# Patient Record
Sex: FEMALE | Race: BLACK OR AFRICAN AMERICAN | Employment: UNEMPLOYED | URBAN - METROPOLITAN AREA
[De-identification: names, ages, dates, MRNs, and addresses within clinical notes are randomized per-mention and may not be internally consistent; named-entity substitution may affect disease eponyms.]

---
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
## 2021-02-15 LAB — VARICELLA-ZOSTER VIRUS AB, IGG: NORMAL

## 2021-03-22 ENCOUNTER — APPOINTMENT (OUTPATIENT)
Dept: GENERAL RADIOLOGY | Age: 21
End: 2021-03-22
Payer: COMMERCIAL

## 2021-03-22 ENCOUNTER — HOSPITAL ENCOUNTER (EMERGENCY)
Age: 21
Discharge: HOME | End: 2021-03-22
Payer: COMMERCIAL

## 2021-03-22 VITALS
RESPIRATION RATE: 18 BRPM | WEIGHT: 130 LBS | OXYGEN SATURATION: 99 % | DIASTOLIC BLOOD PRESSURE: 78 MMHG | BODY MASS INDEX: 20.89 KG/M2 | HEIGHT: 66 IN | TEMPERATURE: 98.9 F | SYSTOLIC BLOOD PRESSURE: 123 MMHG | HEART RATE: 82 BPM

## 2021-03-22 DIAGNOSIS — S63.91XA SPRAIN OF RIGHT HAND, INITIAL ENCOUNTER: ICD-10-CM

## 2021-03-22 DIAGNOSIS — S63.501A SPRAIN OF RIGHT WRIST, INITIAL ENCOUNTER: Primary | ICD-10-CM

## 2021-03-22 PROCEDURE — 99211 OFF/OP EST MAY X REQ PHY/QHP: CPT

## 2021-03-22 PROCEDURE — L3809 WHFO W/O JOINTS PRE OTS: HCPCS

## 2021-03-22 PROCEDURE — 73110 X-RAY EXAM OF WRIST: CPT

## 2021-03-22 PROCEDURE — 73130 X-RAY EXAM OF HAND: CPT

## 2021-03-22 RX ORDER — IBUPROFEN 600 MG/1
600 TABLET ORAL EVERY 6 HOURS PRN
Qty: 20 TABLET | Refills: 0 | Status: SHIPPED | OUTPATIENT
Start: 2021-03-22 | End: 2021-11-12 | Stop reason: ALTCHOICE

## 2021-03-22 RX ORDER — IBUPROFEN 200 MG
400 TABLET ORAL EVERY 6 HOURS PRN
COMMUNITY
End: 2021-03-22 | Stop reason: ALTCHOICE

## 2021-03-22 ASSESSMENT — PAIN DESCRIPTION - PROGRESSION: CLINICAL_PROGRESSION: GRADUALLY IMPROVING

## 2021-03-22 ASSESSMENT — PAIN DESCRIPTION - LOCATION: LOCATION: WRIST

## 2021-03-22 ASSESSMENT — PAIN DESCRIPTION - FREQUENCY: FREQUENCY: CONTINUOUS

## 2021-03-22 ASSESSMENT — PAIN DESCRIPTION - PAIN TYPE: TYPE: ACUTE PAIN

## 2021-03-22 ASSESSMENT — PAIN DESCRIPTION - ORIENTATION: ORIENTATION: RIGHT

## 2021-03-22 ASSESSMENT — PAIN DESCRIPTION - DESCRIPTORS: DESCRIPTORS: SHOOTING

## 2021-03-22 NOTE — ED PROVIDER NOTES
respiratory distress  · CV:  Regular rate. Regular rhythm. No murmurs, gallops, or rubs. 2+ distal pulses  · GI:  Abdomen Soft,   · Musculoskeletal: Moves all extremities x 4. Warm and well perfused, no clubbing, cyanosis, or edema. Capillary refill <3 seconds/ She has f ull ROM Of the left shoulder, She has full ROM of the left knee and ankle. diminished rom of the right wrist with pain, no edema, normal radial pulse and cap refill. · Integument: skin warm and dry. No rashes. · Lymphatic: no lymphadenopathy noted  · Neurologic: GCS 15, no focal deficits, symmetric strength 5/5 in the upper and lower extremities bilaterally  · Psychiatric: Normal Affect    Lab / Imaging Results   (All laboratory and radiology results have been personally reviewed by myself)  Labs:  No results found for this visit on 03/22/21. Imaging: All Radiology results interpreted by Radiologist unless otherwise noted. XR HAND RIGHT (MIN 3 VIEWS)   Final Result   No fracture or dislocation of right wrist or hand. XR WRIST RIGHT (MIN 3 VIEWS)   Final Result   No fracture or dislocation of right wrist or hand. ED Course / Medical Decision Making   Medications - No data to display     MDM:   Obtain an x-ray of the right hand and wrist and it was negative. She was placed in a Velcro wrist splint I advised her to apply an ice pack 10 minutes at a time every 2-3 hours I ordered ibuprofen as needed for pain if no improvement she should see her doctor for reevaluation. Assessment      1. Sprain of right wrist, initial encounter    2.  Sprain of right hand, initial encounter      Plan   Discharge to home and advised to contact Elsie Quintero MD  [address]       As needed   Patient condition is good    New Medications     New Prescriptions    IBUPROFEN (ADVIL;MOTRIN) 600 MG TABLET    Take 1 tablet by mouth every 6 hours as needed for Pain     Electronically signed by Román Mock

## 2021-03-22 NOTE — LETTER
[facility]  [address] [facility] [address]  Phone: [phone]               March 22, 2021    Patient: Maday Mayes   YOB: 2000   Date of Visit: 3/22/2021       To Whom It May Concern:    Maday Mayes was seen and treated in our emergency department on 3/22/2021. She will be absent from work on 3/23 due to medical reasons.       Sincerely,       RUBEN Doran CNP         Signature:__________________________________

## 2021-05-11 ENCOUNTER — HOSPITAL ENCOUNTER (EMERGENCY)
Age: 21
Discharge: HOME | End: 2021-05-11
Payer: COMMERCIAL

## 2021-05-11 VITALS
DIASTOLIC BLOOD PRESSURE: 79 MMHG | HEIGHT: 65 IN | SYSTOLIC BLOOD PRESSURE: 121 MMHG | BODY MASS INDEX: 21.16 KG/M2 | TEMPERATURE: 97.7 F | RESPIRATION RATE: 16 BRPM | WEIGHT: 127 LBS | OXYGEN SATURATION: 99 % | HEART RATE: 79 BPM

## 2021-05-11 DIAGNOSIS — J06.9 UPPER RESPIRATORY TRACT INFECTION, UNSPECIFIED TYPE: Primary | ICD-10-CM

## 2021-05-11 LAB — SARS-COV-2, NAAT: NOT DETECTED

## 2021-05-11 PROCEDURE — 99212 OFFICE O/P EST SF 10 MIN: CPT

## 2021-05-11 RX ORDER — DOXYCYCLINE HYCLATE 100 MG
100 TABLET ORAL 2 TIMES DAILY
Qty: 20 TABLET | Refills: 0 | Status: SHIPPED | OUTPATIENT
Start: 2021-05-11 | End: 2021-05-21

## 2021-05-11 NOTE — ED PROVIDER NOTES
Department of Emergency [address] [facility]  Provider Note  Admit Date/RoomTime: 5/11/2021  8:13 AM  Room: 02/02  Chief Complaint   Otalgia (Pt c/o R sided sore throat, ear ache, chest congestion, and diarrhea for 4 days), Pharyngitis, and Diarrhea    History of Present Illness   Source of history provided by:  Patient. History/Exam Limitations: None. Evelina Real is a 24 y.o. female with no significant medical history. Patient presents with a 5-day history of sinus pressure, bilateral ear pain, nasal congestion, sore throat greatest on the right, chills, chest congestion, and diarrhea. No nausea or hypogeusia. No chest pain or shortness of breath. She does note some abdominal cramping primarily in the upper abdomen prior to the diarrhea. No melena or hematochezia. No risk factors for infectious diarrhea. No history of underlying cardiopulmonary disease. No exposures to any individuals diagnosed with COVID-19 or PUIs. No high risk domestic or international travel. 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 Did have Covid approximately 5 months ago and her second dose of the during the vaccine over 30 days ago. ROS    Pertinent positives and negatives are stated within HPI, all other systems reviewed and are negative. History reviewed. No pertinent surgical history. Social History:  reports that she has never smoked. She has never used smokeless tobacco. She reports that she does not drink alcohol or use drugs. Family History: family history is not on file. Allergies: Patient has no known allergies. Physical Exam            ED Triage Vitals [05/11/21 0813]   BP Temp Temp Source Heart Rate Resp SpO2 Height Weight - Scale   121/79 97.7 °F (36.5 °C) Infrared 79 16 99 % 5' 5\" (1.651 m) 127 lb (57.6 kg)      Oxygen Saturation Interpretation: Normal.    Gen.: Vitals noted no distress. Afebrile. Normal phonation, no stridor, no trismus.   Not hypoxemic. Clinically well-appearing. ENT: Appears well-hydrated. TMs bilaterally are mildly erythematous. Posterior oropharynx is erythematous but without exudate or asymmetry. Neck: Supple. No meningismus through full range of motion. No anterior cervical or submandibular lymphadenopathy. No posterior lymphadenopathy. Cardiac: Regular rate rhythm no murmur. Lungs: Good aeration throughout. No adventitious breath sounds. Abdomen: Soft, nontender, nonsurgical throughout. Normoactive bowel sounds. Extremities: No peripheral edema, negative Homans bilaterally no cords. Skin: No rash. Neuro: No gross neurologic deficits. Lab / Imaging Results   (All laboratory and radiology results have been personally reviewed by myself)  Labs:  No results found for this visit on 05/11/21. Imaging: All Radiology results interpreted by Radiologist unless otherwise noted. No orders to display     ED Course / Medical Decision Making   Medications - No data to display       Differential Diagnosis: Is extensive but includes viral URI, COVID-19, community-acquired pneumonia, etc.    MDM:     This is a 24 y.o. female who presents with the above history. The patient has an unremarkable physical exam.  Did obtain a rapid Covid however the  apparently is not able to perform that currently. I am told will be able to perform it within the next hour. Informed the patient that I will call her if it comes back positive. If negative, she is cleared to return to work tomorrow. Will be covered with doxycycline for the otitis media and likely sinusitis. The patient patient is clinically very well-appearing and nontoxic with no evidence of acute cardiopulmonary compromise. 09:30-rapid Covid came back negative per the tech. Plan of Care: Normal progression of disease discussed. All questions answered. Explained symptomatic treatment.   Instruction provided in the use of fluids, vaporizer, acetaminophen,

## 2021-05-29 ENCOUNTER — HOSPITAL ENCOUNTER (EMERGENCY)
Age: 21
Discharge: HOME | End: 2021-05-29
Payer: COMMERCIAL

## 2021-05-29 VITALS
HEIGHT: 65 IN | WEIGHT: 125 LBS | BODY MASS INDEX: 20.83 KG/M2 | OXYGEN SATURATION: 99 % | HEART RATE: 78 BPM | TEMPERATURE: 97.8 F | DIASTOLIC BLOOD PRESSURE: 80 MMHG | SYSTOLIC BLOOD PRESSURE: 134 MMHG | RESPIRATION RATE: 14 BRPM

## 2021-05-29 DIAGNOSIS — J06.9 VIRAL URI WITH COUGH: Primary | ICD-10-CM

## 2021-05-29 PROCEDURE — 99211 OFF/OP EST MAY X REQ PHY/QHP: CPT

## 2021-05-29 RX ORDER — GUAIFENESIN, PSEUDOEPHEDRINE HYDROCHLORIDE 600; 60 MG/1; MG/1
1 TABLET, EXTENDED RELEASE ORAL EVERY 12 HOURS
Qty: 10 TABLET | Refills: 0 | Status: SHIPPED | OUTPATIENT
Start: 2021-05-29 | End: 2021-06-03

## 2021-05-29 RX ORDER — FLUTICASONE PROPIONATE 50 MCG
1 SPRAY, SUSPENSION (ML) NASAL DAILY
Qty: 1 BOTTLE | Refills: 0 | Status: SHIPPED | OUTPATIENT
Start: 2021-05-29

## 2021-05-29 RX ORDER — METHYLPREDNISOLONE 4 MG/1
TABLET ORAL
Qty: 21 TABLET | Refills: 0 | Status: SHIPPED | OUTPATIENT
Start: 2021-05-29 | End: 2021-06-04

## 2021-05-29 ASSESSMENT — PAIN SCALES - GENERAL: PAINLEVEL_OUTOF10: 5

## 2021-05-29 ASSESSMENT — PAIN DESCRIPTION - PAIN TYPE
TYPE: ACUTE PAIN
TYPE: ACUTE PAIN

## 2021-05-29 ASSESSMENT — PAIN DESCRIPTION - FREQUENCY
FREQUENCY: CONTINUOUS
FREQUENCY: CONTINUOUS

## 2021-05-29 ASSESSMENT — PAIN DESCRIPTION - LOCATION
LOCATION: CHEST
LOCATION: CHEST

## 2021-05-29 ASSESSMENT — PAIN - FUNCTIONAL ASSESSMENT
PAIN_FUNCTIONAL_ASSESSMENT: ACTIVITIES ARE NOT PREVENTED
PAIN_FUNCTIONAL_ASSESSMENT: ACTIVITIES ARE NOT PREVENTED
PAIN_FUNCTIONAL_ASSESSMENT: 0-10

## 2021-05-29 ASSESSMENT — PAIN DESCRIPTION - PROGRESSION
CLINICAL_PROGRESSION: NOT CHANGED
CLINICAL_PROGRESSION: NOT CHANGED

## 2021-05-29 ASSESSMENT — PAIN DESCRIPTION - ONSET
ONSET: ON-GOING
ONSET: ON-GOING

## 2021-05-29 ASSESSMENT — PAIN DESCRIPTION - DESCRIPTORS
DESCRIPTORS: TIGHTNESS
DESCRIPTORS: TIGHTNESS

## 2021-05-29 NOTE — ED PROVIDER NOTES
[address] [facility]  Department of Emergency Medicine   ED  Encounter Note  Admit Date/RoomTime: 2021  6:01 PM  ED Room:     NAME: Efrem Hernández  : 2000  MRN: [id number]     Chief Complaint:  Cough (Pt states \"I had phlem & productive cough with Blood in it\" ) and URI (Pt states \"I had an URI a month ago & the symptoms seem to persist\" )    History of Present Illness       Efrem Hernández is a 24 y.o. old female who presents to the emergency department with a complaint of sinus congestion, nasal drainage, ear pressure, sore throat and cough. Patient was actually seen earlier this month with all the same complaints. At that time she was diagnosed with a URI and prescribed doxycycline, which she took. She returns stating that again she has all the symptoms. For the past week she is again congested in all the sinuses. Nasal drainage. Sometimes bloody. Ear pressure bilateral.  Throat is a little sore. She states the cough is worse this time. She is actually coughing up phlegm that is also bloody at times. Symptoms are moderate in severity. She has tried over-the-counter ZzzQuil, just to get some sleep at night. Patient has had her coronavirus vaccination. ROS   Pertinent positives and negatives are stated within HPI, all other systems reviewed and are negative. Past Medical History:  has a past medical history of Depression. Surgical History:  has no past surgical history on file. Social History:  reports that she has never smoked. She has never used smokeless tobacco. She reports that she does not drink alcohol and does not use drugs. Family History: family history is not on file. Allergies: Patient has no known allergies.     Physical Exam   Oxygen Saturation Interpretation: Normal.        ED Triage Vitals [21 1804]   BP Temp Temp Source Heart Rate Resp SpO2 Height Weight - Scale   134/80 97.8 °F (36.6 °C) Temporal 78 14 99 % 5' 5\" (1.651 m) 125 lb infection is likely to  be viral in etiology. Antibiotics are not indicated at this time based on clinical presentation and physical findings. She is not hypoxic. Patient is well appearing, non toxic and appropriate for outpatient management. Patient was already put on doxycycline just 20 days ago for a URI. I do not feel she needs another antibiotic. A diagnosis of Upper Respiratory Infection is most likely viral in nature. Antibiotics have no effect on viruses and is not prescribed for viral illnesses. It is recommended that you increase your fluid intake. Take otc Tylenol or Motrin as needed. Place a vaporizer or humidifier in your room to moisten the air. Drink warm teas with honey. If you have a sore throat then gargle with warm salt water 3 times daily. OTC decongestants may or may not benefit you. URI's can last 10-14 days. If you feel that your symptoms are worsening or you have any concerns please follow up with your family doctor or go to your closest ED/Urgent care. Plan of Care/Counseling:  I reviewed today's visit with the patient in addition to providing specific details for the plan of care and counseling regarding the diagnosis and prognosis. Questions are answered at this time and are agreeable with the plan. Assessment     1. Viral URI with cough      Plan   Discharge home. Patient condition is good    New Medications     New Prescriptions    FLUTICASONE (FLONASE) 50 MCG/ACT NASAL SPRAY    1 spray by Nasal route daily    METHYLPREDNISOLONE (MEDROL, MADIHA,) 4 MG TABLET    Take as directed on package insert days 1-6    PSEUDOEPHEDRINE-GUAIFENESIN (MUCINEX D)  MG PER EXTENDED RELEASE TABLET    Take 1 tablet by mouth every 12 hours for 5 days     Electronically signed by LEONARDO Reyna   DD: 5/29/21  **This report was transcribed using voice recognition software.  Every effort was made to ensure accuracy; however, inadvertent computerized transcription errors may be present.   END OF ED PROVIDER NOTE         Michael Wang [place]  05/29/21 5726

## 2021-07-19 ENCOUNTER — HOSPITAL ENCOUNTER (EMERGENCY)
Age: 21
Discharge: HOME | End: 2021-07-19
Payer: COMMERCIAL

## 2021-07-19 VITALS
BODY MASS INDEX: 19.99 KG/M2 | HEART RATE: 84 BPM | SYSTOLIC BLOOD PRESSURE: 113 MMHG | RESPIRATION RATE: 16 BRPM | DIASTOLIC BLOOD PRESSURE: 74 MMHG | TEMPERATURE: 98.2 F | HEIGHT: 65 IN | WEIGHT: 120 LBS | OXYGEN SATURATION: 99 %

## 2021-07-19 DIAGNOSIS — J01.10 ACUTE FRONTAL SINUSITIS, RECURRENCE NOT SPECIFIED: Primary | ICD-10-CM

## 2021-07-19 DIAGNOSIS — H69.83 DYSFUNCTION OF BOTH EUSTACHIAN TUBES: ICD-10-CM

## 2021-07-19 PROCEDURE — 99211 OFF/OP EST MAY X REQ PHY/QHP: CPT

## 2021-07-19 RX ORDER — AMOXICILLIN AND CLAVULANATE POTASSIUM 875; 125 MG/1; MG/1
1 TABLET, FILM COATED ORAL 2 TIMES DAILY
Qty: 14 TABLET | Refills: 0 | Status: SHIPPED | OUTPATIENT
Start: 2021-07-19 | End: 2021-07-26

## 2021-07-19 ASSESSMENT — PAIN DESCRIPTION - ONSET
ONSET: ON-GOING
ONSET: ON-GOING

## 2021-07-19 ASSESSMENT — PAIN DESCRIPTION - PAIN TYPE
TYPE: ACUTE PAIN
TYPE: ACUTE PAIN

## 2021-07-19 ASSESSMENT — PAIN DESCRIPTION - FREQUENCY
FREQUENCY: CONTINUOUS
FREQUENCY: CONTINUOUS

## 2021-07-19 ASSESSMENT — PAIN DESCRIPTION - DESCRIPTORS
DESCRIPTORS: SORE
DESCRIPTORS: SORE

## 2021-07-19 ASSESSMENT — PAIN DESCRIPTION - LOCATION
LOCATION: CHEST
LOCATION: CHEST

## 2021-07-19 ASSESSMENT — PAIN DESCRIPTION - PROGRESSION
CLINICAL_PROGRESSION: NOT CHANGED
CLINICAL_PROGRESSION: NOT CHANGED

## 2021-07-19 ASSESSMENT — PAIN SCALES - GENERAL: PAINLEVEL_OUTOF10: 4

## 2021-07-19 NOTE — ED PROVIDER NOTES
Department of Emergency Medicine   [address] [place] [address]  Provider Note  Admit Date/RoomTime: 2021  3:38 PM  Room:   NAME: Shamika Mcnulty  : 2000  MRN: [id number]     Chief Complaint:  Otalgia (both   /  Pt requesting WORK EXCUSE) and Chest Congestion    History of Present Illness       Shamika Mcnulty is a 24 y.o. old female who presents to the emergency department for evaluation of sinus symptoms she has pressure over her forehead and around her nose on the cheeks. She said she has been sick for 1 week. She is feels pressure over her sinuses. Postnasal drip. And she feels like she has some upper chest congestion. She denies any fever chills or body aches denies any loss of taste or smell states she also has pressure in her ears. ROS    Pertinent positives and negatives are stated within HPI, all other systems reviewed and are negative. History reviewed. No pertinent surgical history. Social History:  reports that she has never smoked. She has never used smokeless tobacco. She reports that she does not drink alcohol and does not use drugs. Family History: family history is not on file. Allergies: Patient has no known allergies. Physical Exam            ED Triage Vitals [21 1542]   BP Temp Temp Source Heart Rate Resp SpO2 Height Weight   113/74 98.2 °F (36.8 °C) Temporal 84 16 99 % 5' 5\" (1.651 m) 120 lb (54.4 kg)      Oxygen Saturation Interpretation: Normal.    Constitutional:  Alert, development consistent with age. Ears:  External Ears: Bilateral normal.               TM's & External Canals: normal appearance, normal TMs bilaterally. Nose:   There is no discharge, swelling or lesions noted. Sinuses: mild Bilateral maxillary sinus tenderness. mild Bilateral frontal sinus tenderness. Mouth:  normal tongue and buccal mucosa. Throat: mild erythema. Airway Patent. Neck/Lymphatics:  Neck Supple.    Respiratory:   Breath sounds: Nestor Yadav, RUBEN - CNP  07/19/21 1622

## 2021-07-19 NOTE — LETTER
[facility]  [address] [facility] [address]  Phone: [phone]               July 19, 2021    Patient: Naomi Grant   YOB: 2000   Date of Visit: 7/19/2021       To Whom It May Concern:    Naomi Grant was seen and treated in our emergency department on 7/19/2021. She was absent from work today due to illness.       Sincerely,       RUBEN Padilla CNP         Signature:__________________________________

## 2021-07-29 ENCOUNTER — HOSPITAL ENCOUNTER (EMERGENCY)
Age: 21
Discharge: HOME | End: 2021-07-29
Payer: COMMERCIAL

## 2021-07-29 ENCOUNTER — APPOINTMENT (OUTPATIENT)
Dept: GENERAL RADIOLOGY | Age: 21
End: 2021-07-29
Payer: COMMERCIAL

## 2021-07-29 VITALS
RESPIRATION RATE: 16 BRPM | DIASTOLIC BLOOD PRESSURE: 84 MMHG | WEIGHT: 120 LBS | OXYGEN SATURATION: 100 % | TEMPERATURE: 97.8 F | BODY MASS INDEX: 19.97 KG/M2 | SYSTOLIC BLOOD PRESSURE: 122 MMHG | HEART RATE: 77 BPM

## 2021-07-29 DIAGNOSIS — S90.32XA CONTUSION OF LEFT FOOT, INITIAL ENCOUNTER: Primary | ICD-10-CM

## 2021-07-29 PROCEDURE — 99211 OFF/OP EST MAY X REQ PHY/QHP: CPT

## 2021-07-29 PROCEDURE — 73630 X-RAY EXAM OF FOOT: CPT

## 2021-07-29 NOTE — LETTER
[facility]  [address] [facility] [address]  Phone: [phone]               July 29, 2021    Patient: Katarina Qureshi   YOB: 2000   Date of Visit: 7/29/2021       To Whom It May Concern:    Katarina Qureshi was seen and treated in our emergency department on 7/29/2021. She may return to work on 7/30/21.       Sincerely,       Rachel Olszewski, PA         Signature:__________________________________

## 2021-07-29 NOTE — ED PROVIDER NOTES
Department of Emergency Medicine   [address] [place] [address]  Provider Note  Admit Date/RoomTime: 2021  8:46 AM  Room:       NAME: Timothy Childress  : 2000  MRN: [id number]     Chief Complaint:  Foot Injury (left foot injury, yesterday)    History of Present Illness         Timothy Childress is a 24 y.o. old female presenting to the emergency department by private vehicle, for traumatic Left foot pain which occured 1 day(s) prior to arrival.  The complaint is due to striking a bar while doing back flip barefoot. Patient has no prior history of pain/injury with regards to today's visit. Since onset the symptoms have been stable with ability to bear weight, but with some pain. Her pain is aggraveated by certain movements or pressure on or palpation of painful area and relieved by rest of injured area and elevation. She denies any back pain, numbness or weakness. Tetanus Status: up to date. ROS   Pertinent positives and negatives are stated within HPI, all other systems reviewed and are negative. Past Medical History:  has a past medical history of Depression. Surgical History:  has no past surgical history on file. Social History:  reports that she has never smoked. She has never used smokeless tobacco. She reports that she does not drink alcohol and does not use drugs. Family History: family history is not on file. Allergies: Patient has no known allergies. Physical Exam   Oxygen Saturation Interpretation: Normal.        ED Triage Vitals [21 0850]   BP Temp Temp src Heart Rate Resp SpO2 Height Weight   122/84 97.8 °F (36.6 °C) -- 77 16 100 % -- 120 lb (54.4 kg)         Constitutional:  Alert, development consistent with age. Neck:  Normal ROM. Supple. Left Foot: plantar asp[ect  midfoot             Tenderness:  moderate. Swelling: None. Deformity: no deformity observed/palpated.               ROM: diminished range with pain.            Skin: There is an area of bruising on the plantar aspect of the midfoot consistent with history of injury. No other lesions or wounds noted. .       Neurovascular: Motor deficit: none. Sensory deficit:   none. Pulse deficit: none. Capillary refill: normal.  Left Toe(s):  diffusely across all digits. Tenderness: none. Swelling: None. Deformity: no deformity observed/palpated. ROM: full range of motion. Skin:  no wounds, erythema, or swelling. Left Ankle: diffusely across ankle            Tenderness:  none. Swelling: None. Deformity: no deformity observed/palpated. ROM: full range of motion. Skin:  no wounds, erythema, or swelling. Gait:  limp due to affected limb. Lymphatics: No lymphangitis or adenopathy noted. Neurological:  Oriented. Motor functions intact. Lab / Imaging Results   (All laboratory and radiology results have been personally reviewed by myself)  Labs:  No results found for this visit on 07/29/21. Imaging: All Radiology results interpreted by Radiologist unless otherwise noted. XR FOOT LEFT (MIN 3 VIEWS)   Final Result   Normal           ED Course / Medical Decision Making   Medications - No data to display     Consult(s):   none. Procedure(s):  None    MDM:      Imaging was obtained based on high suspicion for fracture / bony abnormality as per history/physical findings. Plan is subsequently for symptom control, limited use and  immobilization with appropriate outpatient follow-up. Plan of Care/Counseling:  Melinda Jamison reviewed today's visit with the patient in addition to providing specific details for the plan of care and counseling regarding the diagnosis and prognosis. Questions are answered at this time and are agreeable with the plan. Assessment      1.  Contusion of left foot, initial encounter Plan   Discharged home. Patient condition is good    New Medications     New Prescriptions    No medications on file     Electronically signed by LEONARDO Jamison   DD: 7/29/21  **This report was transcribed using voice recognition software. Every effort was made to ensure accuracy; however, inadvertent computerized transcription errors may be present.   END OF ED PROVIDER NOTE       LEONARDO Grant  07/29/21 1028

## 2021-08-23 ENCOUNTER — HOSPITAL ENCOUNTER (EMERGENCY)
Age: 21
Discharge: HOME | End: 2021-08-23
Payer: COMMERCIAL

## 2021-08-23 VITALS
TEMPERATURE: 98.3 F | OXYGEN SATURATION: 99 % | HEART RATE: 75 BPM | SYSTOLIC BLOOD PRESSURE: 123 MMHG | WEIGHT: 120 LBS | RESPIRATION RATE: 16 BRPM | DIASTOLIC BLOOD PRESSURE: 76 MMHG | BODY MASS INDEX: 19.97 KG/M2

## 2021-08-23 DIAGNOSIS — J03.90 ACUTE TONSILLITIS, UNSPECIFIED ETIOLOGY: Primary | ICD-10-CM

## 2021-08-23 LAB
MONO TEST: NEGATIVE
STREP GRP A PCR: NEGATIVE

## 2021-08-23 PROCEDURE — 36415 COLL VENOUS BLD VENIPUNCTURE: CPT

## 2021-08-23 PROCEDURE — 87880 STREP A ASSAY W/OPTIC: CPT

## 2021-08-23 PROCEDURE — 99211 OFF/OP EST MAY X REQ PHY/QHP: CPT

## 2021-08-23 PROCEDURE — 86308 HETEROPHILE ANTIBODY SCREEN: CPT

## 2021-08-23 RX ORDER — AMOXICILLIN 500 MG/1
500 CAPSULE ORAL 3 TIMES DAILY
Qty: 30 CAPSULE | Refills: 0 | Status: SHIPPED | OUTPATIENT
Start: 2021-08-23 | End: 2021-09-02

## 2021-08-23 NOTE — LETTER
[facility]  [address] [facility] [address]  Phone: [phone]               August 23, 2021    Patient: Muna Doe   YOB: 2000   Date of Visit: 8/23/2021       To Whom It May Concern:    Muna Doe was seen and treated in our emergency department on 8/23/2021. She was absent from work today due to illness.       Sincerely,       RUBEN Kenney CNP         Signature:__________________________________

## 2021-08-23 NOTE — ED PROVIDER NOTES
Department of Emergency Medicine  ED  Note  Admit Date/RoomTime: 8/23/2021  9:21 AM  ED Room: 03/03  Date/Time Seen:  8/23/21 at 10:09 AM EDT    Chief Complaint:  Pharyngitis ([address] 74 ON BACK OF THROAT, RIGHT EAR ACHES TOO) and Letter for School/Work      History of Present Illness:  Source of history provided by:  patient. History/Exam Limitations: none. Daysi Arguelles is a 24 y.o. old female presenting to the emergency department for sore throat pain that started 2 days ago. She said she has been tired has not been running a fever hurts also causing some discomfort in her right ear. She noticed she has white spots in the back of her throat. Denies any abdominal pain or upper respiratory symptoms. Exposed To: Streptococcus: unknown. Infectious Mononucleosis:  unknown. Symptoms:  Pain:  Yes. Muffled Voice:  No.                            Hoarse:  No.                            Difficulty with Secretions:  Yes. Associated Signs & Symptoms: right ear pain. Review of Systems:   Pertinent positives and negatives are stated within HPI, all other systems reviewed and are negative. Past Medical History:  has a past medical history of Depression. Past Surgical History:  has no past surgical history on file. Social History:  reports that she has never smoked. She has never used smokeless tobacco. She reports that she does not drink alcohol and does not use drugs. Family History: family history is not on file. Allergies: Patient has no known allergies. Physical Exam:  Vital signs reviewed. Constitutional:  Alert, development consistent with age. Well appearing and non toxic and not distressed. Ears:  TMs without perforation, injection, or bulging. External canals clear without exudate. Mouth/Throat: Airway Patent.  Floor of mouth soft. moderate erythema, tonsillar hypertrophy, 2+ and exudates present. Handling secretions, no stridor, no evidence of airway compromise, no trismus. No visible abscess or PTA. Neck:  Supple, full ROM, no asymmetry, no meningeal signs. No stridor. There is Bilateral  anterior cervical node tenderness. Lungs:  Clear to auscultation and breath sounds equal.    CV: Regular rate and rhythm, normal heart sounds, without pathological murmurs, ectopy, gallops, or rubs. Abdomen:  Soft, nontender, good bowel sounds. No organomegaly,   Skin:  Warm and dry, No rashes, no erythema present. Lymphatics: No lymphangitis. Neurological:  GCS 15, Oriented. Motor functions intact.    -------------------Nursing Notes / Prior Records & Vitals Reviewed Section----------------------   (The nursing notes within the ED encounter, home medications, current encounter or past encounter records and vital signs as below have been reviewed)   /76   Pulse 75   Temp 98.3 °F (36.8 °C)   Resp 16   Wt 120 lb (54.4 kg)   LMP 07/30/2021 (Approximate)   SpO2 99%   BMI 19.97 kg/m²   Oxygen Saturation Interpretation: Normal.  -------------------------------------------Test Results Section---------------------------------------------  (All laboratory and radiology results have been personally reviewed by myself)  Laboratory:  Results for orders placed or performed during the hospital encounter of 08/23/21   Strep Screen Group A Throat    Specimen: Throat   Result Value Ref Range    Strep Grp A PCR Negative Negative   Mononucleosis Screen   Result Value Ref Range    Mono Test Negative Negative       Radiology: All Radiology results interpreted by Radiologist unless otherwise noted. No orders to display     -----------------------------ED Course / Medical Decision Making Section--------------------------  ED Course Medications:  Medications - No data to display    Medical Decision Making:   Pharyngitis, rapid strep negative.  No evidence of PTA, RP abscess, epiglottitis, ludwigs angina, or other life threatening or deep space infection or airway compromise. Also did a Monospot and it was negative. She does have suppurative tonsillitis I did start her on amoxicillin she can take Tylenol or ibuprofen for discomfort as needed and follow-up with her doctor she should return or go to the ED if any worsening or no improvement    ------------------------------------Impression & Disposition Section------------------------------------  Impression(s):  1. Acute tonsillitis, unspecified etiology        Disposition:  Disposition: Discharge to home  Patient condition is good    Discharge Medication List as of 8/23/2021 10:15 AM      START taking these medications    Details   amoxicillin (AMOXIL) 500 MG capsule Take 1 capsule by mouth 3 times daily for 10 days, Disp-30 capsule, R-0Normal           * NOTE: This report was transcribed using voice recognition software. Every effort was made to ensure accuracy; however, inadvertent computerized transcription errors may be present.            Kye England, RUBEN - CNP  08/23/21 [facility], RUBEN  CNP  08/25/21 1 [facility], APRN - CNP  08/25/21 1 [facility]  08/25/21 5447

## 2021-10-08 ENCOUNTER — HOSPITAL ENCOUNTER (EMERGENCY)
Age: 21
Discharge: HOME | End: 2021-10-08
Payer: COMMERCIAL

## 2021-10-08 VITALS
BODY MASS INDEX: 20.8 KG/M2 | WEIGHT: 125 LBS | SYSTOLIC BLOOD PRESSURE: 114 MMHG | HEART RATE: 73 BPM | RESPIRATION RATE: 14 BRPM | TEMPERATURE: 97.7 F | OXYGEN SATURATION: 98 % | DIASTOLIC BLOOD PRESSURE: 76 MMHG

## 2021-10-08 DIAGNOSIS — J01.90 ACUTE SINUSITIS, RECURRENCE NOT SPECIFIED, UNSPECIFIED LOCATION: Primary | ICD-10-CM

## 2021-10-08 PROCEDURE — 99211 OFF/OP EST MAY X REQ PHY/QHP: CPT

## 2021-10-08 RX ORDER — AMOXICILLIN AND CLAVULANATE POTASSIUM 875; 125 MG/1; MG/1
1 TABLET, FILM COATED ORAL 2 TIMES DAILY
Qty: 14 TABLET | Refills: 0 | Status: SHIPPED | OUTPATIENT
Start: 2021-10-08 | End: 2021-10-15

## 2021-10-08 RX ORDER — PSEUDOEPHEDRINE HCL 60 MG/1
60 TABLET ORAL EVERY 6 HOURS PRN
Qty: 20 TABLET | Refills: 0 | Status: SHIPPED | OUTPATIENT
Start: 2021-10-08

## 2021-10-08 ASSESSMENT — PAIN SCALES - GENERAL: PAINLEVEL_OUTOF10: 5

## 2021-10-08 ASSESSMENT — PAIN DESCRIPTION - DESCRIPTORS: DESCRIPTORS: HEADACHE

## 2021-10-08 ASSESSMENT — PAIN DESCRIPTION - LOCATION: LOCATION: HEAD;EAR

## 2021-10-08 NOTE — LETTER
[facility]  [address] [facility] [address]  Phone: [phone]               October 8, 2021    Patient: Darline Mike   YOB: 2000   Date of Visit: 10/8/2021       To Whom It May Concern:    Darline Mike was seen and treated in our emergency department on 10/8/2021. She was absent from work today due to illness.       Sincerely,       RUBEN Vidal CNP         Signature:__________________________________

## 2021-10-08 NOTE — ED PROVIDER NOTES
Department of Emergency [address] [facility]  Provider Note  Admit Date/RoomTime: 10/8/2021 12:43 PM  Room:   NAME: Jose Juan Mora  : 2000  MRN: [id number]     Chief Complaint:  Ear Fullness (both ears feel clogged. ), Sinusitis (sinus pressure, eye drainage), and Cough (productive cough)    History of Present Illness       Jose Juan Mora is a 24 y.o. old female who presents to the emergency department pressure over her right cheek and right forehead from her sinuses. She said both ears feel plugged. Slight productive cough does not have chest pain or shortness of breath has not lost her taste or smell. Does not have a fever. Does not have body aches. States she has been vaccinated for Covid states she has not been exposed to Covid. ROS    Pertinent positives and negatives are stated within HPI, all other systems reviewed and are negative. History reviewed. No pertinent surgical history. Social History:  reports that she has never smoked. She has never used smokeless tobacco. She reports that she does not drink alcohol and does not use drugs. Family History: family history is not on file. Allergies: Patient has no known allergies. Physical Exam            ED Triage Vitals [10/08/21 1246]   BP Temp Temp src Heart Rate Resp SpO2 Height Weight   114/76 97.7 °F (36.5 °C) -- 73 14 98 % -- 125 lb (56.7 kg)      Oxygen Saturation Interpretation: Normal.    Constitutional:  Alert, development consistent with age. Ears:  External Ears: Bilateral normal.               TM's & External Canals: normal appearance, normal TMs bilaterally. Nose:   There is no discharge, swelling or lesions noted. Sinuses: mild Right maxillary sinus tenderness. mild Right frontal sinus tenderness. Mouth:  normal tongue and buccal mucosa. Throat: normal and no erythema or exudates noted. Teeth and gums normal..  Airway Patent. Neck/Lymphatics:  Neck Supple. Respiratory:   Breath sounds: Bilateral normal.  Lung sounds: normal.   CV:  Regular rate and rhythm, normal heart sounds,   Integument:  Normal turgor. Warm, dry, without visible rash. Neurological:  Oriented. Motor functions intact. Lab / Imaging Results   (All laboratory and radiology results have been personally reviewed by myself)  Labs:  No results found for this visit on 10/08/21. Imaging: All Radiology results interpreted by Radiologist unless otherwise noted. No orders to display     ED Course / Medical Decision Making   Medications - No data to display       MDM:   SHe does not feel she has been exposed to Covid she has been vaccinated does just feel she has a sinus infection does not want tested for Covid. 1. Acute sinusitis, recurrence not specified, unspecified location      Plan   Discharge to home and advised to contact call the referral line to get established with a   [phone]       Patient condition is good    New Medications     New Prescriptions    AMOXICILLIN-CLAVULANATE (AUGMENTIN) 875-125 MG PER TABLET    Take 1 tablet by mouth 2 times daily for 7 days    PSEUDOEPHEDRINE (SUDAFED) 60 MG TABLET    Take 1 tablet by mouth every 6 hours as needed for Congestion     Electronically signed by RUBEN Oliver CNP   DD: 10/8/21  **This report was transcribed using voice recognition software. Every effort was made to ensure accuracy; however, inadvertent computerized transcription errors may be present.   END OF ED PROVIDER NOTE     RUBEN Oliver CNP  10/08/21 4521

## 2021-11-12 ENCOUNTER — APPOINTMENT (OUTPATIENT)
Dept: GENERAL RADIOLOGY | Age: 21
End: 2021-11-12
Payer: COMMERCIAL

## 2021-11-12 ENCOUNTER — HOSPITAL ENCOUNTER (EMERGENCY)
Age: 21
Discharge: HOME | End: 2021-11-12
Payer: COMMERCIAL

## 2021-11-12 VITALS
OXYGEN SATURATION: 98 % | BODY MASS INDEX: 19.99 KG/M2 | DIASTOLIC BLOOD PRESSURE: 84 MMHG | HEIGHT: 65 IN | RESPIRATION RATE: 20 BRPM | SYSTOLIC BLOOD PRESSURE: 134 MMHG | WEIGHT: 120 LBS | HEART RATE: 87 BPM | TEMPERATURE: 98.9 F

## 2021-11-12 DIAGNOSIS — S93.519A SPRAIN OF INTERPHALANGEAL JOINT OF TOE, INITIAL ENCOUNTER: ICD-10-CM

## 2021-11-12 DIAGNOSIS — S63.502A SPRAIN OF LEFT WRIST, INITIAL ENCOUNTER: Primary | ICD-10-CM

## 2021-11-12 PROCEDURE — 99212 OFFICE O/P EST SF 10 MIN: CPT

## 2021-11-12 PROCEDURE — 73630 X-RAY EXAM OF FOOT: CPT

## 2021-11-12 PROCEDURE — 73110 X-RAY EXAM OF WRIST: CPT

## 2021-11-12 RX ORDER — IBUPROFEN 600 MG/1
600 TABLET ORAL 3 TIMES DAILY PRN
Qty: 30 TABLET | Refills: 0 | Status: SHIPPED | OUTPATIENT
Start: 2021-11-12 | End: 2022-10-14 | Stop reason: ALTCHOICE

## 2021-11-12 ASSESSMENT — PAIN DESCRIPTION - LOCATION: LOCATION: WRIST

## 2021-11-12 ASSESSMENT — PAIN SCALES - GENERAL: PAINLEVEL_OUTOF10: 10

## 2021-11-12 ASSESSMENT — PAIN DESCRIPTION - ORIENTATION: ORIENTATION: LEFT

## 2021-11-12 NOTE — Clinical Note
Gerardo Walker was seen and treated in our emergency department on 11/12/2021. She may return to work on 11/13/2021. If you have any questions or concerns, please don't hesitate to call.       Nati Julio, APRN - CNP

## 2021-11-12 NOTE — ED PROVIDER NOTES
Department of Emergency [address] [facility]  Provider Note  Admit Date/Time: 2021  8:08 AM  Room:   NAME: Won Perdomo  : 2000  MRN: [id number]     Chief Complaint:  Fall (fell yesterday hurt left wrist and left gr toe)    History of Present Illness        Won Perdomo is a 24 y.o. female who has a past medical history of:   Past Medical History:   Diagnosis Date    Depression     presents to the urgent care center by private car for evaluation of an injury to her left wrist and her left great toe. She said she fell out of bed yesterday she was not feeling good and tried to catch herself with her hand and ended up hurting her left wrist.  She denies any other injuries or any other complaints of pain  ROS    Pertinent positives and negatives are stated within HPI, all other systems reviewed and are negative. History reviewed. No pertinent surgical history. Social History:  reports that she has never smoked. She has never used smokeless tobacco. She reports that she does not drink alcohol and does not use drugs. Family History: family history is not on file. Allergies: Patient has no known allergies. Physical Exam   Oxygen Saturation Interpretation: Normal.   ED Triage Vitals [21 0808]   BP Temp Temp Source Pulse Resp SpO2 Height Weight   134/84 98.9 °F (37.2 °C) Infrared 87 20 98 % 5' 5\" (1.651 m) 120 lb (54.4 kg)       Physical Exam  · Constitutional/General: Alert and oriented x3, well appearing, non toxic in NAD  · HEENT:  NC/NT. Clear conjunctiva,   · Neck: Supple, full ROM,   · Respiratory: Lungs clear to auscultation bilaterally, no wheezes, rales, or rhonchi. Not in respiratory distress  · CV:  Regular rate. Regular rhythm. · Musculoskeletal: Moves all extremities x 4. Left  Wrist has no edema there is no abrasions or open areas she has a good radial pulse.   She has pain with any attempted movement of the wrist.  ·   Integument: skin warm and dry. No rashes. · Neurologic: GCS 15, no focal deficits,   · Psychiatric: Normal Affect    Lab / Imaging Results   (All laboratory and radiology results have been personally reviewed by myself)  Labs:  No results found for this visit on 11/12/21. Imaging: All Radiology results interpreted by Radiologist unless otherwise noted. XR WRIST LEFT (MIN 3 VIEWS)   Final Result   No acute fractures or dislocations in the left wrist.         XR FOOT LEFT (MIN 3 VIEWS)   Final Result   No acute fractures or dislocations in the left foot. ED Course / Medical Decision Making   Medications - No data to display       Consult(s):   None    MDM:   Trays of the left wrist and the left foot were negative. She was placed in a wrist splint I ordered her ibuprofen for pain she can use ice 10 minutes at a time every 2-3 hours as needed. She should follow-up with her doctor if no improvement    Assessment      1. Sprain of left wrist, initial encounter    2. Sprain of interphalangeal joint of toe, initial encounter      Plan   Discharge to home and advised to contact call the referral line to get established with a   [phone]  Schedule an appointment as soon as possible for a visit      Patient condition is good    New Medications     New Prescriptions    IBUPROFEN (ADVIL;MOTRIN) 600 MG TABLET    Take 1 tablet by mouth 3 times daily as needed for Pain     Electronically signed by RUBEN Valentin CNP   DD: 11/12/21  **This report was transcribed using voice recognition software. Every effort was made to ensure accuracy; however, inadvertent computerized transcription errors may be present.   END OF ED PROVIDER NOTE     RUBEN Valentin CNP  11/12/21 0108

## 2021-12-06 ENCOUNTER — TELEPHONE (OUTPATIENT)
Dept: ADMINISTRATIVE | Age: 21
End: 2021-12-06
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
## 2021-12-06 NOTE — TELEPHONE ENCOUNTER
Patient wanted sooner appointment, Scheduled under Dr. Maddie Pastor Please advise if appointments needs changed.   Currently schedule for  12/10

## 2021-12-09 DIAGNOSIS — M25.461 BILATERAL KNEE SWELLING: Primary | ICD-10-CM

## 2021-12-09 DIAGNOSIS — M25.462 BILATERAL KNEE SWELLING: Primary | ICD-10-CM

## 2021-12-10 ENCOUNTER — OFFICE VISIT (OUTPATIENT)
Dept: ORTHOPEDIC SURGERY | Age: 21
End: 2021-12-10
Payer: COMMERCIAL

## 2021-12-10 VITALS — TEMPERATURE: 98 F | HEIGHT: 65 IN | BODY MASS INDEX: 19.99 KG/M2 | WEIGHT: 120 LBS

## 2021-12-10 DIAGNOSIS — S80.01XA CONTUSION OF RIGHT KNEE, INITIAL ENCOUNTER: Primary | ICD-10-CM

## 2021-12-10 DIAGNOSIS — S80.02XA CONTUSION OF LEFT KNEE, INITIAL ENCOUNTER: ICD-10-CM

## 2021-12-10 PROCEDURE — G8484 FLU IMMUNIZE NO ADMIN: HCPCS | Performed by: ORTHOPAEDIC SURGERY

## 2021-12-10 PROCEDURE — G8427 DOCREV CUR MEDS BY ELIG CLIN: HCPCS | Performed by: ORTHOPAEDIC SURGERY

## 2021-12-10 PROCEDURE — 99203 OFFICE O/P NEW LOW 30 MIN: CPT | Performed by: ORTHOPAEDIC SURGERY

## 2021-12-10 PROCEDURE — 1036F TOBACCO NON-USER: CPT | Performed by: ORTHOPAEDIC SURGERY

## 2021-12-10 PROCEDURE — G8420 CALC BMI NORM PARAMETERS: HCPCS | Performed by: ORTHOPAEDIC SURGERY

## 2021-12-10 NOTE — PROGRESS NOTES
 Worried About Running Out of Food in the Last Year: Not on file    Mei of Food in the Last Year: Not on file   Transportation Needs:     Lack of Transportation (Medical): Not on file    Lack of Transportation (Non-Medical): Not on file   Physical Activity:     Days of Exercise per Week: Not on file    Minutes of Exercise per Session: Not on file   Stress:     Feeling of Stress : Not on file   Social Connections:     Frequency of Communication with Friends and Family: Not on file    Frequency of Social Gatherings with Friends and Family: Not on file    Attends Buddhism Services: Not on file    Active Member of [address] [place] [address] [employer] or Organizations: Not on file    Attends Club or Organization Meetings: Not on file    Marital Status: Not on file   Intimate Partner Violence:     Fear of Current or Ex-Partner: Not on file    Emotionally Abused: Not on file    Physically Abused: Not on file    Sexually Abused: Not on file   Housing Stability:     Unable to Pay for Housing in the Last Year: Not on file    Number of [place] in the Last Year: Not on file    Unstable Housing in the Last Year: Not on file     No family history on file. ROS:    Skin: (-) rash,(-) psoriasis,(-) eczema, (-)skin cancer. Musculoskeletal: (-) fractures,  (-) dislocations,(-) collagen vascular disease, (-) fibromyalgia, (-) multiple sclerosis, (-) muscular dystrophy, (-) RSD,(-) joint pain (-)swelling, (-) joint pain,swelling. Neurologic: (-) epilepsy, (-)seizures,(-) brain tumor,(-) TIA, (-)stroke, (-)headaches, (-)Parkinson disease,(-) memory loss, (-) LOC. Cardiovascular: (-) Chest pain, (-) swelling in legs/feet, (-) SOB, (-) cramping in legs/feet with walking. Physical Exam:    Temp 98 °F (36.7 °C)   Ht 5' 5\" (1.651 m)   Wt 120 lb (54.4 kg)   BMI 19.97 kg/m²     GENERAL: alert, appears stated age, cooperative, no distress    HEENT: Head is normocephalic, atraumatic. PERRLA. SKIN: Clean, dry, intact.  There is not any cellulitis or cutaneous lesions noted in the lower extremities except noted below in MSK    PULMONARY: breathing is regular and unlabored, no acute distress    CV: The bilateral lower extremities are warm and well-perfused with brisk capillary refill. 2+ pulses LE bilateral.     PSYCHIATRY: Pleasant mood, appropriate behavior, follows commands    NEURO: Sensation is intact distally with light touch with no alteration. Motor exam of the lower extremities show quadriceps, hamstrings, foot dorsiflexion and plantarflexion grossly intact 5/5. LYMPH: No lymphedema present distally in LE.      Ortho Exam       Right Knee  Alignment:  neutral   ROM:  5 degrees extension to 100 degrees flexion      Crepitus:  no   Joint Tenderness:  medial joint line   Effusion:   moderate        Patellar tilt test:  negative   Patellar facet tenderness:  negative medial   negative lateral   Trochlear tenderness:  negative   Moving patellar apprehension test:  negative   Hoffa's test:  negative medial   negative lateral      Hyperextension test:  negative medial   negative lateral   Lachman test:  negative      Anterior drawer test:  negative   Pivot shift test:  negative   Posterior drawer:   negative   Varus laxity at 30 degrees:  negative      Valgus laxity at 30 degrees:   negative      Dulce's test:  positive       Quadriceps atrophy:  None     Left Knee  Alignment:  neutral   ROM:  5 degrees extension to 100 degrees flexion      Crepitus:  no   Joint Tenderness:  medial joint line   Effusion:   moderate        Patellar tilt test:  negative   Patellar facet tenderness:  negative medial   negative lateral   Trochlear tenderness:  negative   Moving patellar apprehension test:  negative   Hoffa's test:  negative medial   negative lateral      Hyperextension test:  negative medial   negative lateral   Lachman test:  negative      Anterior drawer test:  negative   Pivot shift test:  negative   Posterior drawer:   negative Varus laxity at 30 degrees:  negative      Valgus laxity at 30 degrees:   negative      Dulce's test:  positive       Quadriceps atrophy:  None       Imaging:  XR WRIST LEFT (MIN 3 VIEWS)    Result Date: 11/12/2021  EXAMINATION: 3  XRAY VIEWS OF THE LEFT WRIST 11/12/2021 8:26 am COMPARISON: None. HISTORY: ORDERING SYSTEM PROVIDED HISTORY: fall TECHNOLOGIST PROVIDED HISTORY: Reason for exam:->fall FINDINGS: Carpal bones and alignment are maintained. Distal radius and ulna are intact. No acute fracture or dislocation. No acute fractures or dislocations in the left wrist.     XR FOOT LEFT (MIN 3 VIEWS)    Result Date: 11/12/2021  EXAMINATION: THREE XRAY VIEWS OF THE LEFT FOOT 11/12/2021 8:26 am COMPARISON: None. HISTORY: ORDERING SYSTEM PROVIDED HISTORY: injury to great toe TECHNOLOGIST PROVIDED HISTORY: Reason for exam:->injury to great toe FINDINGS: There is no evidence of acute fracture. There is normal alignment of the tarsometatarsal joints. No acute joint abnormality. No focal osseous lesion. No focal soft tissue abnormality. No acute fractures or dislocations in the left foot. XR KNEE LEFT (MIN 4 VIEWS)    Result Date: 12/10/2021  EXAMINATION: FOUR XRAY VIEWS OF THE RIGHT KNEE; FOUR XRAY VIEWS OF THE LEFT KNEE 12/10/2021 8:26 am COMPARISON: None. HISTORY: ORDERING SYSTEM PROVIDED HISTORY: Bilateral knee swelling FINDINGS: There is no evidence of acute fracture. There is normal alignment. No acute joint abnormality. No focal osseous lesion. No focal soft tissue abnormality. Unremarkable bilateral knees. XR KNEE RIGHT (MIN 4 VIEWS)    Result Date: 12/10/2021  EXAMINATION: FOUR XRAY VIEWS OF THE RIGHT KNEE; FOUR XRAY VIEWS OF THE LEFT KNEE 12/10/2021 8:26 am COMPARISON: None. HISTORY: ORDERING SYSTEM PROVIDED HISTORY: Bilateral knee swelling FINDINGS: There is no evidence of acute fracture. There is normal alignment. No acute joint abnormality. No focal osseous lesion.  No focal soft tissue abnormality. Unremarkable bilateral knees. Sugey Lugo was seen today for knee pain. Diagnoses and all orders for this visit:    Contusion of right knee, initial encounter  -     MRI KNEE RIGHT WO CONTRAST; Future    Contusion of left knee, initial encounter  -     MRI KNEE LEFT WO CONTRAST; Future        Patient seen and examined. X-rays reviewed. Patient sustained a contact twisting injury to the bilateral knee after MRI. Patient's main complaint is instability of symptomatic knee. Exam and history is consistent with possible meniscus tear and other possible internal derangement such as ACL tear, patellar dislocation, loose body. MRI recommended for further evaluation management. Follow-up after MRI.          Jyotsna Santiago DO  12/10/21

## 2022-10-14 ENCOUNTER — HOSPITAL ENCOUNTER (EMERGENCY)
Age: 22
Discharge: HOME | End: 2022-10-14
Payer: COMMERCIAL

## 2022-10-14 VITALS
DIASTOLIC BLOOD PRESSURE: 95 MMHG | HEART RATE: 54 BPM | OXYGEN SATURATION: 98 % | TEMPERATURE: 98.4 F | RESPIRATION RATE: 16 BRPM | SYSTOLIC BLOOD PRESSURE: 180 MMHG

## 2022-10-14 DIAGNOSIS — K08.89 TOOTHACHE: Primary | ICD-10-CM

## 2022-10-14 DIAGNOSIS — H66.92 ACUTE LEFT OTITIS MEDIA: ICD-10-CM

## 2022-10-14 PROCEDURE — 6370000000 HC RX 637 (ALT 250 FOR IP): Performed by: PHYSICIAN ASSISTANT

## 2022-10-14 PROCEDURE — 99283 EMERGENCY DEPT VISIT LOW MDM: CPT

## 2022-10-14 RX ORDER — IBUPROFEN 600 MG/1
600 TABLET ORAL ONCE
Status: COMPLETED | OUTPATIENT
Start: 2022-10-14 | End: 2022-10-14

## 2022-10-14 RX ORDER — AMOXICILLIN 500 MG/1
500 CAPSULE ORAL 3 TIMES DAILY
Qty: 30 CAPSULE | Refills: 0 | Status: SHIPPED | OUTPATIENT
Start: 2022-10-14 | End: 2022-10-24

## 2022-10-14 RX ORDER — IBUPROFEN 600 MG/1
600 TABLET ORAL 3 TIMES DAILY PRN
Qty: 30 TABLET | Refills: 0 | Status: SHIPPED | OUTPATIENT
Start: 2022-10-14

## 2022-10-14 RX ORDER — AMOXICILLIN 250 MG/1
500 CAPSULE ORAL ONCE
Status: COMPLETED | OUTPATIENT
Start: 2022-10-14 | End: 2022-10-14

## 2022-10-14 RX ADMIN — IBUPROFEN 600 MG: 600 TABLET ORAL at 14:31

## 2022-10-14 RX ADMIN — AMOXICILLIN 500 MG: 250 CAPSULE ORAL at 14:30

## 2022-10-14 ASSESSMENT — PAIN - FUNCTIONAL ASSESSMENT: PAIN_FUNCTIONAL_ASSESSMENT: 0-10

## 2022-10-14 ASSESSMENT — PAIN SCALES - GENERAL
PAINLEVEL_OUTOF10: 10
PAINLEVEL_OUTOF10: 10
PAINLEVEL_OUTOF10: 5

## 2022-10-14 NOTE — ED PROVIDER NOTES
[facility]                                                                                                                                    Department of Emergency Medicine   ED  Provider Note  Admit Date/RoomTime: 10/14/2022  1:51 PM  ED Room: [address]        HPI:  10/14/22,   Time: 2:16 PM EDT         Brett Holt is a 22 y.o. female presenting to the ED for left ear and wisdom tooth pain, beginning 1 day ago. The complaint has been persistent, moderate in severity, and worsened by talking and chewing. The patient states she was told years ago to see someone about getting her wisdom teeth extracted. Admits she didn't follow-up. States left lower wisdom tooth just started really causing her pain. The gums are swollen and tender around the area. Now having left ear pain as well. No drainage, fever or sore throat. States she is not pregnant. ROS:     Constitutional: Negative for fever and chills  HENT:  See HPI  Eyes: Negative for pain, discharge and redness  Respiratory:  Negative for shortness of breath, cough and wheezing  Cardiovascular: Negative for CP, edema or palpitations  Gastrointestinal: Negative for nausea, vomiting, diarrhea and abdominal distention  Genitourinary: Negative for dysuria and frequency  Musculoskeletal: Negative for back pain and arthralgia  Skin: Negative for rash and wound  Neurological: Negative for weakness and headaches  Hematological: Negative for adenopathy    All other systems reviewed and are negative      -------------------------------- PAST HISTORY ----------------------------------  Past Medical History:  has a past medical history of Depression. Past Surgical History:  has no past surgical history on file. Social History:  reports that she has never smoked. She has never used smokeless tobacco. She reports that she does not drink alcohol and does not use drugs. Family History: family history is not on file.      The patients home medications have been reviewed. Allergies: Patient has no known allergies. --------------------------------- RESULTS ------------------------------------------  All laboratory and radiology results have been personally reviewed by myself   LABS:  No results found for this visit on 10/14/22. RADIOLOGY:  Interpreted by Radiologist.  No orders to display       ----------------- NURSING NOTES AND VITALS REVIEWED ---------------   The nursing notes within the ED encounter and vital signs as below have been reviewed. BP (!) 180/95   Pulse 54   Temp 98.4 °F (36.9 °C)   Resp 16   SpO2 98%   Oxygen Saturation Interpretation: Normal      --------------------------------PHYSICAL EXAM------------------------------------      Constitutional/General: Alert and oriented x3, well appearing, non toxic in NAD  Head: NC/AT  Eyes: PERRL, EOMI  Left TM intact but red and bulging. External canal looks good. No pus or edema. # 17 is erupting with minor overlying gum tissue and inflammation. No obvious abscess  Mouth: Oropharynx clear, handling secretions, no trismus  Neck: Supple, full ROM, no meningeal signs  Pulmonary: Lungs clear to auscultation bilaterally, no wheezes, rales, or rhonchi. Not in respiratory distress  Cardiovascular:  Regular rate and rhythm, no murmurs, gallops, or rubs. 2+ distal pulses  Extremities: Moves all extremities x 4. Warm and well perfused  Skin: warm and dry without rash  Neurologic: GCS 15,  Intact. No focal deficits  Psych: Normal Affect      ------------------------ ED COURSE/MEDICAL DECISION MAKING----------------------  Medications   amoxicillin (AMOXIL) capsule 500 mg (has no administration in time range)   ibuprofen (ADVIL;MOTRIN) tablet 600 mg (has no administration in time range)         Medical Decision Making:    Acute left OM and wisdom tooth pain. Plan Amoxil and Ibuprofen here and for home. F/U with oral surgeon as discussed. Pt aware and agreeable to this plan. Counseling: The emergency provider has spoken with the patient and discussed todays results, in addition to providing specific details for the plan of care and counseling regarding the diagnosis and prognosis. Questions are answered at this time and they are agreeable with the plan.      ------------------------ IMPRESSION AND DISPOSITION -------------------------------    IMPRESSION  1. Toothache    2.  Acute left otitis media        DISPOSITION  Disposition: Discharge to home  Patient condition is stable                   Vijay Luna PA-C  10/14/22 [id number]

## 2024-03-24 ENCOUNTER — HOSPITAL ENCOUNTER (EMERGENCY)
Age: 24
Discharge: HOME | End: 2024-03-24
Payer: MEDICAID

## 2024-03-24 VITALS
BODY MASS INDEX: 20.8 KG/M2 | DIASTOLIC BLOOD PRESSURE: 80 MMHG | WEIGHT: 125 LBS | SYSTOLIC BLOOD PRESSURE: 120 MMHG | RESPIRATION RATE: 18 BRPM | HEART RATE: 75 BPM | OXYGEN SATURATION: 100 % | TEMPERATURE: 98 F

## 2024-03-24 DIAGNOSIS — T14.8XXA SKIN AVULSION: Primary | ICD-10-CM

## 2024-03-24 PROCEDURE — 99212 OFFICE O/P EST SF 10 MIN: CPT

## 2024-03-24 PROCEDURE — 12001 RPR S/N/AX/GEN/TRNK 2.5CM/<: CPT

## 2024-03-24 RX ORDER — NORETHINDRONE ACETATE AND ETHINYL ESTRADIOL 1MG-20(21)
1 KIT ORAL DAILY
COMMUNITY

## 2024-03-24 NOTE — ED PROVIDER NOTES
Superficial avulsion.       Neurovascular:              Motor deficit: none.              Sensory deficit:   none.              Pulse deficit: none.              Capillary refill: normal.  Left Hand:              Tenderness: none.              Swelling: None.             Deformity: no.             Skin:  no wounds, erythema, or swelling.  Left Wrist:               Tenderness:  none.              Swelling: None.            Deformity: no deformity observed/palpated.             ROM: full range of motion.             Skin:  no wounds, erythema, or swelling.   Lymphatics: No lymphangitis or adenopathy noted.  Neurological:  Oriented.  Motor functions intact.      Lab / Imaging Results   (All laboratory and radiology results have been personally reviewed by myself)  Labs:  No results found for this visit on 03/24/24.    Imaging:  All Radiology results interpreted by Radiologist unless otherwise noted.  No orders to display       ED Course / Medical Decision Making   Medications - No data to display     Consult(s):   None    Procedure(s):  PROCEDURE NOTE  3/24/24       Time: 1500    LACERATION REPAIR  Risks, benefits and alternatives (for applicable procedures below) described.   Performed By: RUBEN Camacho CNP.  Informed consent: Verbal consent obtained.  The patient was counseled regarding the procedure in person, it's indications, risks, potential complications and alternatives and any questions were answered. Verbal consent was obtained.    Laceration #: 1.  Location: Left middle digit  Length: 0.5 cm.  The wound area was irrigated with sterile saline and draped in a sterile fashion.  Local Anesthesia:  not required/indicated.  The wound was explored with the following results:    Thickness: superficial. no foreign body or tendon injury seen.  Debridement: None.  Undermining: None.  Wound Margins Revised: None.  Flaps Aligned: no flaps.  The wound was closed with Derma-bond tissue adhesive.  Dressing:  a

## 2024-08-02 ENCOUNTER — HOSPITAL ENCOUNTER (EMERGENCY)
Age: 24
Discharge: HOME | End: 2024-08-02
Payer: MEDICAID

## 2024-08-02 VITALS
SYSTOLIC BLOOD PRESSURE: 132 MMHG | DIASTOLIC BLOOD PRESSURE: 84 MMHG | WEIGHT: 125 LBS | BODY MASS INDEX: 20.83 KG/M2 | OXYGEN SATURATION: 100 % | HEART RATE: 77 BPM | RESPIRATION RATE: 18 BRPM | HEIGHT: 65 IN | TEMPERATURE: 98.1 F

## 2024-08-02 DIAGNOSIS — K52.9 GASTROENTERITIS: Primary | ICD-10-CM

## 2024-08-02 DIAGNOSIS — R19.7 DIARRHEA, UNSPECIFIED TYPE: ICD-10-CM

## 2024-08-02 LAB
BILIRUB UR QL STRIP: NEGATIVE
BUN BLD-MCNC: 8 MG/DL (ref 6–20)
CHLORIDE BLD-SCNC: 104 MMOL/L (ref 100–108)
CLARITY UR: CLEAR
CO2 BLD CALC-SCNC: 28 MMOL/L (ref 22–29)
COLOR UR: YELLOW
CREAT BLD-MCNC: 0.7 MG/DL (ref 0.5–1)
EGFR, POC: >90 ML/MIN/1.73M2
ERYTHROCYTE [DISTWIDTH] IN BLOOD BY AUTOMATED COUNT: 11.2 % (ref 11.5–15)
GLUCOSE BLD-MCNC: 81 MG/DL (ref 74–99)
GLUCOSE UR STRIP-MCNC: NEGATIVE MG/DL
HCT VFR BLD AUTO: 39.4 % (ref 34–48)
HGB BLD-MCNC: 13.4 G/DL (ref 11.5–15.5)
HGB UR QL STRIP.AUTO: ABNORMAL
INFLUENZA A BY PCR: NOT DETECTED
INFLUENZA B BY PCR: NOT DETECTED
KETONES UR STRIP-MCNC: NEGATIVE MG/DL
LEUKOCYTE ESTERASE UR QL STRIP: NEGATIVE
MCH RBC QN AUTO: 33.6 PG (ref 26–35)
MCHC RBC AUTO-ENTMCNC: 34 G/DL (ref 32–34.5)
MCV RBC AUTO: 98.7 FL (ref 80–99.9)
NITRITE UR QL STRIP: NEGATIVE
PH UR STRIP: 6.5 [PH] (ref 5–9)
PLATELET # BLD AUTO: 272 K/UL (ref 130–450)
PMV BLD AUTO: 9.6 FL (ref 7–12)
POC ANION GAP: 5 MMOL/L (ref 7–16)
POTASSIUM BLD-SCNC: 3.8 MMOL/L (ref 3.5–5)
PROT UR STRIP-MCNC: NEGATIVE MG/DL
RBC # BLD AUTO: 3.99 M/UL (ref 3.5–5.5)
RBC #/AREA URNS HPF: ABNORMAL /HPF
SARS-COV-2 RDRP RESP QL NAA+PROBE: NOT DETECTED
SODIUM BLD-SCNC: 137 MMOL/L (ref 132–146)
SP GR UR STRIP: 1.02 (ref 1–1.03)
SPECIMEN DESCRIPTION: NORMAL
UROBILINOGEN UR STRIP-ACNC: 0.2 EU/DL (ref 0–1)
WBC #/AREA URNS HPF: ABNORMAL /HPF
WBC OTHER # BLD: 7 K/UL (ref 4.5–11.5)

## 2024-08-02 PROCEDURE — 87502 INFLUENZA DNA AMP PROBE: CPT

## 2024-08-02 PROCEDURE — 99211 OFF/OP EST MAY X REQ PHY/QHP: CPT

## 2024-08-02 PROCEDURE — 80051 ELECTROLYTE PANEL: CPT

## 2024-08-02 PROCEDURE — 82947 ASSAY GLUCOSE BLOOD QUANT: CPT

## 2024-08-02 PROCEDURE — 84520 ASSAY OF UREA NITROGEN: CPT

## 2024-08-02 PROCEDURE — 36415 COLL VENOUS BLD VENIPUNCTURE: CPT

## 2024-08-02 PROCEDURE — 81001 URINALYSIS AUTO W/SCOPE: CPT

## 2024-08-02 PROCEDURE — 85027 COMPLETE CBC AUTOMATED: CPT

## 2024-08-02 PROCEDURE — 82565 ASSAY OF CREATININE: CPT

## 2024-08-02 PROCEDURE — 87635 SARS-COV-2 COVID-19 AMP PRB: CPT

## 2024-08-02 RX ORDER — DICYCLOMINE HYDROCHLORIDE 10 MG/1
10 CAPSULE ORAL
Qty: 120 CAPSULE | Refills: 0 | Status: SHIPPED | OUTPATIENT
Start: 2024-08-02

## 2024-08-02 ASSESSMENT — PAIN SCALES - GENERAL: PAINLEVEL_OUTOF10: 3

## 2024-08-02 ASSESSMENT — PAIN DESCRIPTION - DESCRIPTORS: DESCRIPTORS: ACHING;DISCOMFORT

## 2024-08-02 ASSESSMENT — PAIN DESCRIPTION - LOCATION: LOCATION: HEAD

## 2024-08-02 ASSESSMENT — PAIN - FUNCTIONAL ASSESSMENT: PAIN_FUNCTIONAL_ASSESSMENT: 0-10

## 2024-08-02 NOTE — ED PROVIDER NOTES
[facility]  Department of Emergency Medicine     Encounter Note  Admit Date/RoomTime: 2024  9:09 AM   Room:     NAME: Thierry Henderson  : 2000  MRN: [id number]     Chief Complaint:  Diarrhea (For 8 days ), Headache, and Abdominal Pain (Cramping with diarrhea )    History of Present Illness        Thierry Henderson is a 23 y.o. old female who presents to the Urgent Care with complaints of an approximately 8-day history of 4-5 episodes of diarrheal stools daily.  Patient states symptoms started with a headache, nasal congestion, sore throat with no fever.  States that she began having diarrhea with 1-2 episodes a day, now it is up to 4-5 episodes a day.  It is not bloody, not watery.  No recent antibiotic use, no abdominal pain.  Reports her boyfriend has similar symptoms.  No new foods or food indiscretions.  States that the headache, sore throat and nasal congestion has resolved.  She has not had any fevers, denies any dysuria.  Is not currently on her menstrual period.    ROS   Pertinent positives and negatives are stated within HPI, all other systems reviewed and are negative.    Past Medical History:  has a past medical history of Depression.    Surgical History:  has no past surgical history on file.    Social History:  reports that she has been smoking e-cigarettes. She has never used smokeless tobacco. She reports that she does not drink alcohol and does not use drugs.    Family History: family history is not on file.     Allergies: Patient has no known allergies.    Physical Exam   Oxygen Saturation Interpretation: Normal.        ED Triage Vitals   BP Temp Temp src Pulse Resp SpO2 Height Weight   -- -- -- -- -- -- -- --       Physical Exam  General Appearance/Constitutional:  Alert, development consistent with age.  HEENT:  NC/NT. PERRLA.  Airway patent.  Neck:  Supple.  No lymphadenopathy.  Respiratory: Lungs Clear to auscultation and breath sounds equal.  CV:  Regular rate and  medications    Details   dicyclomine (BENTYL) 10 MG capsule Take 1 capsule by mouth 4 times daily (before meals and nightly), Disp-120 capsule, R-0Normal           Electronically signed by RUBEN Morfin CNP   DD: 8/2/24  **This report was transcribed using voice recognition software. Every effort was made to ensure accuracy; however, inadvertent computerized transcription errors may be present.  END OF PROVIDER NOTE      Donna Caballero APRN - CNP  08/02/24 [id number]

## 2024-08-02 NOTE — DISCHARGE INSTRUCTIONS
If you continue to have diarrhea you will need to follow-up with your PCP for possible stool studies.  Your labs today look fine.  No sign of infection.  No sign of viral illness that we can test for, COVID and flu is negative.  If this worsens significantly or if you develop any abdominal pain you will need to go to the ER.

## 2024-09-24 ENCOUNTER — APPOINTMENT (OUTPATIENT)
Dept: GENERAL RADIOLOGY | Age: 24
End: 2024-09-24
Payer: MEDICAID

## 2024-09-24 ENCOUNTER — HOSPITAL ENCOUNTER (EMERGENCY)
Age: 24
Discharge: HOME | End: 2024-09-24
Payer: MEDICAID

## 2024-09-24 VITALS
HEART RATE: 79 BPM | OXYGEN SATURATION: 100 % | SYSTOLIC BLOOD PRESSURE: 131 MMHG | DIASTOLIC BLOOD PRESSURE: 83 MMHG | RESPIRATION RATE: 18 BRPM | TEMPERATURE: 98.1 F

## 2024-09-24 DIAGNOSIS — S61.432A PUNCTURE WOUND OF LEFT HAND WITHOUT FOREIGN BODY, INITIAL ENCOUNTER: Primary | ICD-10-CM

## 2024-09-24 PROCEDURE — 73130 X-RAY EXAM OF HAND: CPT

## 2024-09-24 PROCEDURE — 99211 OFF/OP EST MAY X REQ PHY/QHP: CPT

## 2025-04-07 ENCOUNTER — APPOINTMENT (OUTPATIENT)
Dept: GENERAL RADIOLOGY | Age: 25
End: 2025-04-07
Payer: COMMERCIAL

## 2025-04-07 ENCOUNTER — HOSPITAL ENCOUNTER (EMERGENCY)
Age: 25
Discharge: HOME | End: 2025-04-07
Payer: COMMERCIAL

## 2025-04-07 VITALS
DIASTOLIC BLOOD PRESSURE: 85 MMHG | RESPIRATION RATE: 18 BRPM | OXYGEN SATURATION: 100 % | BODY MASS INDEX: 20.8 KG/M2 | HEART RATE: 69 BPM | SYSTOLIC BLOOD PRESSURE: 136 MMHG | WEIGHT: 125 LBS | TEMPERATURE: 98.5 F
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
## 2025-04-07 DIAGNOSIS — S80.00XA CONTUSION OF KNEE, UNSPECIFIED LATERALITY, INITIAL ENCOUNTER: ICD-10-CM

## 2025-04-07 DIAGNOSIS — S39.012A STRAIN OF LUMBAR REGION, INITIAL ENCOUNTER: ICD-10-CM

## 2025-04-07 DIAGNOSIS — S16.1XXA ACUTE STRAIN OF NECK MUSCLE, INITIAL ENCOUNTER: ICD-10-CM

## 2025-04-07 PROCEDURE — 72040 X-RAY EXAM NECK SPINE 2-3 VW: CPT

## 2025-04-07 PROCEDURE — 99211 OFF/OP EST MAY X REQ PHY/QHP: CPT

## 2025-04-07 PROCEDURE — 73560 X-RAY EXAM OF KNEE 1 OR 2: CPT

## 2025-04-07 PROCEDURE — 72100 X-RAY EXAM L-S SPINE 2/3 VWS: CPT

## 2025-04-07 ASSESSMENT — PAIN SCALES - GENERAL: PAINLEVEL_OUTOF10: 7

## 2025-04-07 ASSESSMENT — PAIN DESCRIPTION - FREQUENCY: FREQUENCY: CONTINUOUS

## 2025-04-07 ASSESSMENT — PAIN DESCRIPTION - PAIN TYPE: TYPE: ACUTE PAIN

## 2025-04-07 ASSESSMENT — PAIN DESCRIPTION - DESCRIPTORS: DESCRIPTORS: ACHING;SORE;TENDER

## 2025-04-07 ASSESSMENT — PAIN DESCRIPTION - LOCATION: LOCATION: BACK;NECK;KNEE

## 2025-04-07 ASSESSMENT — PAIN - FUNCTIONAL ASSESSMENT: PAIN_FUNCTIONAL_ASSESSMENT: 0-10
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
## 2025-05-17 ENCOUNTER — HOSPITAL ENCOUNTER (EMERGENCY)
Age: 25
Discharge: HOME | End: 2025-05-17

## 2025-05-17 VITALS
HEART RATE: 103 BPM | TEMPERATURE: 98.1 F | RESPIRATION RATE: 16 BRPM | SYSTOLIC BLOOD PRESSURE: 132 MMHG | OXYGEN SATURATION: 99 % | DIASTOLIC BLOOD PRESSURE: 78 MMHG | WEIGHT: 125 LBS | BODY MASS INDEX: 20.8 KG/M2

## 2025-05-17 DIAGNOSIS — S01.81XA FACIAL LACERATION, INITIAL ENCOUNTER: Primary | ICD-10-CM

## 2025-05-17 PROCEDURE — 6360000002 HC RX W HCPCS: Performed by: NURSE PRACTITIONER

## 2025-05-17 PROCEDURE — 12001 RPR S/N/AX/GEN/TRNK 2.5CM/<: CPT

## 2025-05-17 PROCEDURE — 99211 OFF/OP EST MAY X REQ PHY/QHP: CPT

## 2025-05-17 PROCEDURE — 90471 IMMUNIZATION ADMIN: CPT | Performed by: NURSE PRACTITIONER

## 2025-05-17 PROCEDURE — 90715 TDAP VACCINE 7 YRS/> IM: CPT | Performed by: NURSE PRACTITIONER

## 2025-05-17 RX ORDER — LIDOCAINE HYDROCHLORIDE 10 MG/ML
5 INJECTION, SOLUTION INFILTRATION; PERINEURAL ONCE
Status: COMPLETED | OUTPATIENT
Start: 2025-05-17 | End: 2025-05-17

## 2025-05-17 RX ADMIN — TETANUS TOXOID, REDUCED DIPHTHERIA TOXOID AND ACELLULAR PERTUSSIS VACCINE, ADSORBED 0.5 ML: 5; 2.5; 8; 8; 2.5 SUSPENSION INTRAMUSCULAR at 12:05

## 2025-05-17 RX ADMIN — LIDOCAINE HYDROCHLORIDE 5 ML: 10 INJECTION, SOLUTION INFILTRATION; PERINEURAL at 12:04

## 2025-05-17 ASSESSMENT — PAIN SCALES - GENERAL: PAINLEVEL_OUTOF10: 0

## 2025-05-17 ASSESSMENT — PAIN - FUNCTIONAL ASSESSMENT: PAIN_FUNCTIONAL_ASSESSMENT: 0-10

## 2025-05-17 NOTE — DISCHARGE INSTR - COC
Continuity of Care Form    Patient Name: Thierry Henderson   :  2000  MRN:  [id number]    Admit date:  2025  Discharge date:  ***    Code Status Order: No Order   Advance Directives:     Admitting Physician:  No admitting provider for patient encounter.  PCP: No primary care provider on file.    Discharging Nurse: ***  Discharging Hospital Unit/Room#:   Discharging Unit Phone Number: ***    Emergency Contact:   Extended Emergency Contact Information  Primary Emergency Contact: Leo Antunez  Address: [address] [place] [address] [facility]  Home Phone: [phone]  Mobile Phone: [phone]  Relation: Parent  Secondary Emergency Contact: Ketan Soares  Address: [address] [place] [address] [facility]  Home Phone: [phone]  Relation: Other    Past Surgical History:  History reviewed. No pertinent surgical history.    Immunization History:   Immunization History   Administered Date(s) Administered    COVID-19, MODERNA BLUE border, Primary or Immunocompromised, (age 12y+), IM, 100 mcg/0.5mL 2021, 2021    TDaP, ADACEL (age 10y-64y), BOOSTRIX (age 10y+), IM, 0.5mL 2025       Active Problems:  There is no problem list on file for this patient.      Isolation/Infection:   Isolation            No Isolation          Patient Infection Status    None to display              Nurse Assessment:  Last Vital Signs: /78   Pulse (!) 103   Temp 98.1 °F (36.7 °C)   Resp 16   Wt 56.7 kg (125 lb)   LMP 2025   SpO2 99%   BMI 20.80 kg/m²     Last documented pain score (0-10 scale): Pain Level: 0  Last Weight:   Wt Readings from Last 1 Encounters:   25 56.7 kg (125 lb)     Mental Status:  {IP PT MENTAL STATUS:}    IV Access:  { JARERLL IV ACCESS:253784380}    Nursing Mobility/ADLs:  Walking   {CHP DME ADLs:446719038}  Transfer  {CHP DME ADLs:057918385}  Bathing  {CHP DME ADLs:105533434}  Dressing
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
## 2025-05-17 NOTE — DISCHARGE INSTRUCTIONS
Motrin or Tylenol as needed for pain.  Report to the emergency department for headache or increasing facial pain.